# Patient Record
Sex: FEMALE | Race: WHITE | NOT HISPANIC OR LATINO | URBAN - NONMETROPOLITAN AREA
[De-identification: names, ages, dates, MRNs, and addresses within clinical notes are randomized per-mention and may not be internally consistent; named-entity substitution may affect disease eponyms.]

---

## 2023-05-16 ENCOUNTER — OFFICE VISIT (OUTPATIENT)
Dept: URGENT CARE | Facility: PHYSICIAN GROUP | Age: 63
End: 2023-05-16
Payer: COMMERCIAL

## 2023-05-16 VITALS
DIASTOLIC BLOOD PRESSURE: 62 MMHG | TEMPERATURE: 97.8 F | RESPIRATION RATE: 14 BRPM | HEIGHT: 64 IN | WEIGHT: 121 LBS | HEART RATE: 64 BPM | BODY MASS INDEX: 20.66 KG/M2 | OXYGEN SATURATION: 99 % | SYSTOLIC BLOOD PRESSURE: 102 MMHG

## 2023-05-16 DIAGNOSIS — F17.210 TOBACCO DEPENDENCE DUE TO CIGARETTES: ICD-10-CM

## 2023-05-16 DIAGNOSIS — H81.10 BENIGN PAROXYSMAL POSITIONAL VERTIGO, UNSPECIFIED LATERALITY: ICD-10-CM

## 2023-05-16 PROCEDURE — 3078F DIAST BP <80 MM HG: CPT | Performed by: NURSE PRACTITIONER

## 2023-05-16 PROCEDURE — 99214 OFFICE O/P EST MOD 30 MIN: CPT | Performed by: NURSE PRACTITIONER

## 2023-05-16 PROCEDURE — 3074F SYST BP LT 130 MM HG: CPT | Performed by: NURSE PRACTITIONER

## 2023-05-16 RX ORDER — ROSUVASTATIN CALCIUM 10 MG/1
10 TABLET, COATED ORAL DAILY
COMMUNITY
Start: 2023-02-03 | End: 2023-08-02

## 2023-05-16 RX ORDER — HYDROXYZINE HYDROCHLORIDE 25 MG/1
25 TABLET, FILM COATED ORAL 3 TIMES DAILY PRN
COMMUNITY

## 2023-05-16 RX ORDER — MECLIZINE HYDROCHLORIDE 25 MG/1
TABLET ORAL
Qty: 30 TABLET | Refills: 0 | Status: SHIPPED | OUTPATIENT
Start: 2023-05-16

## 2023-05-16 RX ORDER — ASPIRIN 81 MG/1
81 TABLET ORAL DAILY
COMMUNITY

## 2023-05-16 RX ORDER — ONDANSETRON 4 MG/1
TABLET, ORALLY DISINTEGRATING ORAL
Qty: 15 TABLET | Refills: 0 | Status: SHIPPED | OUTPATIENT
Start: 2023-05-16

## 2023-05-16 ASSESSMENT — FIBROSIS 4 INDEX: FIB4 SCORE: 1.19

## 2023-05-16 ASSESSMENT — VISUAL ACUITY: OU: 1

## 2023-05-16 NOTE — PROGRESS NOTES
Patient has consented to treatment and for use of patient information for treatment and billing purposes.    Chief Complaint:    Chief Complaint   Patient presents with    Dizziness     X1 day Light headed, dizzy spells, nausea,         History of Present Illness: 63 y.o.  female presents to clinic with     Complains of dizziness described as a spinning sensation, inability to maintain balance, a lightheaded sensation.  Denies any syncopal episodes or fainting  Occurs with bending over and then going to a standing position  Denies dimming vision, visual floaters, speech change, confusion, unconsciousness, drowsiness, personality change, headaches, paresthesias otalgia, otorrhea, tinnitus, hearing loss         Medications, Allergies, and current problem list reviewed today in Epic.    Physical Exam:    Vitals:    05/16/23 0917   BP: 102/62   Pulse: 64   Resp: 14   Temp: 36.6 °C (97.8 °F)   SpO2: 99%             Physical Exam  Constitutional:       General: She is not in acute distress.     Appearance: She is not ill-appearing or toxic-appearing.   HENT:      Head: Normocephalic.      Nose: Nose normal.      Mouth/Throat:      Mouth: Mucous membranes are moist.   Eyes:      General: Lids are normal. Vision grossly intact. Gaze aligned appropriately.      Extraocular Movements: Extraocular movements intact.      Right eye: Nystagmus present.      Left eye: Nystagmus present.      Conjunctiva/sclera: Conjunctivae normal.      Pupils: Pupils are equal, round, and reactive to light.      Comments: Nystagmus noted when looking towards the left and bilateral eyes.   Cardiovascular:      Rate and Rhythm: Normal rate.   Pulmonary:      Effort: Pulmonary effort is normal.      Breath sounds: No wheezing or rhonchi.   Musculoskeletal:         General: Normal range of motion.      Cervical back: Normal range of motion and neck supple.   Skin:     General: Skin is warm and dry.   Neurological:      General: No focal deficit  present.      Mental Status: She is alert and oriented to person, place, and time.      Cranial Nerves: Cranial nerves 2-12 are intact.      Motor: Motor function is intact.      Gait: Gait is intact.      Comments: Nicolette-Hallpike: negative  negative for dizziness or nystagmus.     Psychiatric:         Mood and Affect: Mood normal.         Behavior: Behavior normal.            Medical Decision Making and Plan:  I personally reviewed prior external notes and test results pertinent to today's visit.   Shared decision-making was utilized with patient did develop treatment plan and clinic course.     Very pleasant 62-year-old female presents today with BPPV symptoms.  Batson-Hallpike maneuver was negative.  We will go ahead and treat with meclizine and Zofran to help with nausea.      Pt is a 63 F who presents for evaluation of dizziness.   Dizziness is episodic and aggravated with position such as leaning over and movement of head. Pt denies symptoms of exertional dizziness, palpitations, headache/neckpain, neurologic complaint, confusion, SOB, chest pain, back/abdominal pain or fever.  Vital signs within normal limits. Neurologic exam grossly normal.  HEENT exam shows unidirectional nystagmus-otherwise normal, cardiovascular exam normal.   ABCD screening is NEG for serious causes.    Symptoms and exam reassuring for likely benign peripheral cause. .    ABCD screening for serious causes  -Abnormal neurologic finding: NO  -Bidirectional nystagmus: NO  -Cannot Walk (ataxia): NO  -D's (diplopia, dysarthria, dysphagia, dysphonia, dysmetria): NO    Differential diagnosis, natural history, supportive care, and indications for immediate follow-up discussed.    Patient does smoke cigarettes and was counseled for 3 minutes during her exam about smoking sensation.  Patient does state that she does not intend to quit smoking.    The patient remained stable during the urgent care visit.           1. Benign paroxysmal positional  vertigo, unspecified laterality  - meclizine (ANTIVERT) 25 MG Tab; 0.5 TO 1 TAB BY MOUTH EVERY 6 HOURS ONLY IF NEEDED FOR DIZZINESS, NAUSEA, OR VOMITING. MAY CAUSE DROWSINESS.  Dispense: 30 Tablet; Refill: 0  - ondansetron (ZOFRAN ODT) 4 MG TABLET DISPERSIBLE; 1 TAB, ALLOW TO DISINTEGRATE IN MOUTH, EVERY 8 HOURS ONLY IF NEEDED FOR NAUSEA OR VOMITING.  Dispense: 15 Tablet; Refill: 0    2. Tobacco dependence due to cigarettes    Other orders  - rosuvastatin (CRESTOR) 10 MG Tab; Take 10 mg by mouth every day.  - aspirin 81 MG EC tablet; Take 81 mg by mouth every day.  - hydrOXYzine HCl (ATARAX) 25 MG Tab; Take 25 mg by mouth 3 times a day as needed for Itching.      Patient was encouraged to monitor symptoms closely. Those signs and symptoms which would warrant concern and mandate seeking a higher level of service through the emergency department discussed at length.  Patient stated agreement and understanding of this plan of care.    Follow up:  Advised the patient to follow-up with the primary care physician for recheck, reevaluation, and consideration of further management.  Patient verbalized understanding of treatment plan and has no further questions regarding care.    Follow-up in urgent care or emergency department if symptoms continue or worsen in 3-5 days      Disposition:  Home in stable condition       Voice Recognition Disclaimer:  Portions of this document were created using voice recognition software. The software does have a chance of producing errors of grammar and possibly content. I have made every reasonable attempt to correct obvious errors, but there may be errors of grammar and possibly content that I did not discover before finalizing the documentation.

## 2023-06-13 ENCOUNTER — OFFICE VISIT (OUTPATIENT)
Dept: URGENT CARE | Facility: PHYSICIAN GROUP | Age: 63
End: 2023-06-13
Payer: COMMERCIAL

## 2023-06-13 VITALS
OXYGEN SATURATION: 96 % | DIASTOLIC BLOOD PRESSURE: 68 MMHG | SYSTOLIC BLOOD PRESSURE: 118 MMHG | HEART RATE: 79 BPM | RESPIRATION RATE: 14 BRPM | WEIGHT: 120 LBS | HEIGHT: 64 IN | BODY MASS INDEX: 20.49 KG/M2 | TEMPERATURE: 97.3 F

## 2023-06-13 DIAGNOSIS — K21.9 GASTROESOPHAGEAL REFLUX DISEASE WITHOUT ESOPHAGITIS: ICD-10-CM

## 2023-06-13 DIAGNOSIS — R13.10 DYSPHAGIA, UNSPECIFIED TYPE: ICD-10-CM

## 2023-06-13 PROCEDURE — 3074F SYST BP LT 130 MM HG: CPT | Performed by: NURSE PRACTITIONER

## 2023-06-13 PROCEDURE — 3078F DIAST BP <80 MM HG: CPT | Performed by: NURSE PRACTITIONER

## 2023-06-13 PROCEDURE — 99213 OFFICE O/P EST LOW 20 MIN: CPT | Performed by: NURSE PRACTITIONER

## 2023-06-13 RX ORDER — OMEPRAZOLE 20 MG/1
CAPSULE, DELAYED RELEASE ORAL
Qty: 30 CAPSULE | Refills: 0 | Status: SHIPPED | OUTPATIENT
Start: 2023-06-13

## 2023-06-13 ASSESSMENT — ENCOUNTER SYMPTOMS
DIARRHEA: 0
VOMITING: 0
ABDOMINAL PAIN: 0
HEADACHES: 0
CONSTIPATION: 0
HEARTBURN: 0
FEVER: 0
CHILLS: 0
SORE THROAT: 0
NAUSEA: 0

## 2023-06-13 ASSESSMENT — FIBROSIS 4 INDEX: FIB4 SCORE: 1.19

## 2023-06-13 NOTE — PROGRESS NOTES
"Subjective:   Linsey Erwin is a 63 y.o. female who presents for Difficulty Swallowing (For the past two weeks has had some difficulty swallowing, pt takes pills fine but is now having a hard time, denies sore throat, feels like any food or liquids gets stuck in chest, )    Dysphagia  This is acute condition.  Patient states that she has had some difficulty swallowing over the past 2 weeks that is progressively worsened.  She states that she constantly feels like she needs to swallow and feels like something is stuck midway in her chest.  She is able to drink liquids however is having a harder time swallowing her pills and food.  She denies having any sore throat, chest pain, palpitations, nausea, vomiting, burning sensation in her chest, or epigastric pain.  She states that not eating does seem to help.  She has not tried any over-the-counter medications.          Review of Systems   Constitutional:  Negative for chills, fever and malaise/fatigue.   HENT:  Negative for sore throat.    Gastrointestinal:  Negative for abdominal pain, constipation, diarrhea, heartburn, nausea and vomiting.   Neurological:  Negative for headaches.       Medications, Allergies, and current problem list reviewed today in Epic.     Objective:     /68   Pulse 79   Temp 36.3 °C (97.3 °F) (Temporal)   Resp 14   Ht 1.626 m (5' 4\")   Wt 54.4 kg (120 lb)   SpO2 96%     Physical Exam  Vitals reviewed.   Constitutional:       Appearance: Normal appearance.   HENT:      Head: Normocephalic.      Nose: Nose normal.      Mouth/Throat:      Mouth: Mucous membranes are moist.   Eyes:      Extraocular Movements: Extraocular movements intact.      Conjunctiva/sclera: Conjunctivae normal.      Pupils: Pupils are equal, round, and reactive to light.   Cardiovascular:      Rate and Rhythm: Normal rate and regular rhythm.   Pulmonary:      Effort: Pulmonary effort is normal.      Breath sounds: No wheezing or rhonchi.   Abdominal:      General: " Abdomen is flat. Bowel sounds are normal. There is no distension.      Palpations: Abdomen is soft.      Tenderness: There is no abdominal tenderness. There is no guarding or rebound.      Hernia: No hernia is present.   Musculoskeletal:         General: Normal range of motion.      Cervical back: Normal range of motion and neck supple.   Skin:     General: Skin is warm and dry.   Neurological:      Mental Status: She is alert and oriented to person, place, and time.   Psychiatric:         Mood and Affect: Mood normal.         Behavior: Behavior normal.         Thought Content: Thought content normal.         Judgment: Judgment normal.         Assessment/Plan:     Diagnosis and associated orders:     1. Dysphagia, unspecified type  Referral to Gastroenterology    omeprazole (PRILOSEC) 20 MG delayed-release capsule      2. Gastroesophageal reflux disease without esophagitis  Referral to Gastroenterology    omeprazole (PRILOSEC) 20 MG delayed-release capsule         Comments/MDM:     Discussed with patient at length that HPI and physical exam findings sound like silent GERD.  We will go ahead and place on trial of omeprazole.  Referral placed to gastroenterology in case symptoms continue or worsen as patient may need endoscopy.  Recommended small amounts of food frequently, bland diet, and to not drink alcohol or smoke cigarettes.  And is provided discharge instructions.         Differential diagnosis, natural history, supportive care, and indications for immediate follow-up discussed.    Advised the patient to follow-up with the primary care physician for recheck, reevaluation, and consideration of further management.    Please note that this dictation was created using voice recognition software. I have made a reasonable attempt to correct obvious errors, but I expect that there are errors of grammar and possibly content that I did not discover before finalizing the note.

## 2023-07-10 DIAGNOSIS — R13.10 DYSPHAGIA, UNSPECIFIED TYPE: ICD-10-CM

## 2023-07-10 DIAGNOSIS — K21.9 GASTROESOPHAGEAL REFLUX DISEASE WITHOUT ESOPHAGITIS: ICD-10-CM

## 2023-09-11 RX ORDER — OMEPRAZOLE 20 MG/1
CAPSULE, DELAYED RELEASE ORAL
Qty: 90 CAPSULE | OUTPATIENT
Start: 2023-09-11

## 2023-10-08 RX ORDER — OMEPRAZOLE 20 MG/1
CAPSULE, DELAYED RELEASE ORAL
Qty: 90 CAPSULE | OUTPATIENT
Start: 2023-10-08